# Patient Record
Sex: MALE | URBAN - METROPOLITAN AREA
[De-identification: names, ages, dates, MRNs, and addresses within clinical notes are randomized per-mention and may not be internally consistent; named-entity substitution may affect disease eponyms.]

---

## 2024-03-29 ENCOUNTER — HOSPITAL ENCOUNTER (OUTPATIENT)
Dept: TELEMEDICINE | Facility: HOSPITAL | Age: 69
Discharge: HOME | End: 2024-03-29

## 2024-03-29 DIAGNOSIS — R20.0 RIGHT SIDED NUMBNESS: Primary | ICD-10-CM

## 2024-03-29 PROCEDURE — G0408 INPT/TELE FOLLOW UP 35: HCPCS | Mod: GT,,, | Performed by: STUDENT IN AN ORGANIZED HEALTH CARE EDUCATION/TRAINING PROGRAM

## 2024-03-29 NOTE — TELEMEDICINE CONSULT
[facility]  Vascular Neurology  Comprehensive Stroke Center  TeleVascular Neurology Acute Consultation Note        Consult Information  Consults    Consulting Provider: ARLENE YODER   Current Providers  No providers found    Patient Location: [facility] ED - [place] - [facility] Emergency Department    Spoke hospital nurse at bedside with patient assisting consultant.  Patient information was obtained from patient.       Stroke Documentation  Acute Stroke Times   Last Known Normal Date: 03/29/24  Last Known Normal Time: 1330  Symptom Onset Date: 03/29/24  Stroke Team Arrival Date: 03/29/24  Stroke Team Arrival Time: 1442  CT Interpretation Time: 1450  Thrombolytic Therapy Recommended: No    NIH Scale:  1a. Level of Consciousness: 0-->Alert, keenly responsive  1b. LOC Questions: 0-->Answers both questions correctly  1c. LOC Commands: 0-->Performs both tasks correctly  2. Best Gaze: 0-->Normal  3. Visual: 0-->No visual loss  4. Facial Palsy: 0-->Normal symmetrical movements  5a. Motor Arm, Left: 0-->No drift, limb holds 90 (or 45) degrees for full 10 secs  5b. Motor Arm, Right: 0-->No drift, limb holds 90 (or 45) degrees for full 10 secs  6a. Motor Leg, Left: 0-->No drift, leg holds 30 degree position for full 5 secs  6b. Motor Leg, Right: 0-->No drift, leg holds 30 degree position for full 5 secs  7. Limb Ataxia: 0-->Absent  8. Sensory: 1-->Mild-to-moderate sensory loss, patient feels pinprick is less sharp or is dull on the affected side, or there is a loss of superficial pain with pinprick, but patient is aware of being touched  9. Best Language: 0-->No aphasia, normal  10. Dysarthria: 0-->Normal  11. Extinction and Inattention (formerly Neglect): 0-->No abnormality  Total (NIH Stroke Scale): 1      Modified [place]: Score: 0  [place] Coma Scale:     ABCD2 Score:    [id number] Score:    HAS -BLED Score:    ICH Score:    Hunt & Mulligan  Classification:      There were no vitals taken for this visit.    Van Negative    Medical Decision Making  HPI:  68 y.o. male with a history of aortic valve stenosis, obesity who presents with numbness in the right arm and heaviness in the right leg.  Symptoms noted when he awoke from a nap today.  No prior history of stroke.        Images personally reviewed and interpreted:  Study: Head CT  Study Interpretation:  No evidence of acute ischemia or hemorrhage     Assessment and plan:  67 y/o male with the above history who presents with numbness in the right arm and heaviness in the right leg.    On exam, his only deficit is right facial numbness.  No drift in the upper lower extremities.    Strong suspicion for a lacunar syndrome.  Defer IV thrombolytics due to non disabling symptoms.    -- Plavix load of 300mg x1 and ASA load of 325mg x1 now followed by dual antiplatelet therapy with ASA 81mg + Plavix 75mg x21 day, followed by monotherapy with ASA 81mg thereafter.  If already taking ASA, add Plavix with load similar to above, or, if already on Plavix, add ASA load similar to above and then dual antiplatelet therapy x21 days followed by monotherapy.  If unable to swallow, please administer rectal aspirin 300 mg until cleared by Speech therapy.    -- CTA head and neck STAT to evaluate for the presence of an intracranial LVO or high grade/critical stenosis of the extracranial carotids or vertebrobasilar insufficiency for further risk stratification and management. If noted, please call [facility] to discuss if patient needs transfer for higher level care.    -- MRI brain. Permissive HTN w/ BP<220/110 until MRI rules out the presence of acute ischemia. If acute ischemia seen, please maintain permissive HTN for the next 48-72 hrs.      -- Lipitor 40mg daily.   -- Check Lipid panel, A1c  -- Target LDL<70, A1c<7   -- TTE w/o bubble (If <60 yrs, please add bubble study)  -- PT/OT/SLP    Discussed recommendations w/ attending  physician.   If MD unavailable, recommendations will be conveyed to patient's nurse.     .      Lytics recommendation: Thrombolytic therapy not recommended due to Mild Non-Disabling Symptoms  Thrombectomy recommendation: Awaiting CTA results from Spoke for determination  and No; at this time symptoms not suggestive of large vessel occlusion  Placement recommendation: pending further studies  admit to inpatient               ROS  Physical Exam  No past medical history on file.  No past surgical history on file.  No family history on file.    Diagnoses  Problem Noted   Right Sided Numbness 3/29/2024       Flash Petit MD      Emergent/Acute neurological consultation requested by spoke provider due to critical concerns for possible cerebrovascular event that could result in permanent loss of neurologic/bodily function, severe disability or death of this patient.  Immediate/timely evaluation by a highly prepared expert is paramount for optimal outcomes  High risk for neurological deterioration if not properly diagnosed  High risk for neurological deterioration if not treated promplty/as soon as possible  Complex diagnostic evaluation may be required (advanced imaging)  High risk treatment options (thrombolytics and/or thrombectomy)    Patient care was coordinated with spoke provider, including but not limted to    Discussing likely diagnosis/etiology of symptoms  Making recommendations for further diagnostic studies  Making recommendations for intravenous thrombolytics or other advanced therapies  Making recommendations for disposition (admission/transfer for higher level of care)

## 2024-03-29 NOTE — SUBJECTIVE & OBJECTIVE
HPI:  68 y.o. male with a history of aortic valve stenosis, obesity who presents with numbness in the right arm and heaviness in the right leg.  Symptoms noted when he awoke from a nap today.  No prior history of stroke.        Images personally reviewed and interpreted:  Study: Head CT  Study Interpretation:  No evidence of acute ischemia or hemorrhage     Assessment and plan:  67 y/o male with the above history who presents with numbness in the right arm and heaviness in the right leg.    On exam, his only deficit is right facial numbness.  No drift in the upper lower extremities.    Defer IV thrombolytics due to non disabling symptoms.    -- Plavix load of 300mg x1 and ASA load of 325mg x1 now followed by dual antiplatelet therapy with ASA 81mg + Plavix 75mg x21 day, followed by monotherapy with ASA 81mg thereafter.  If already taking ASA, add Plavix with load similar to above, or, if already on Plavix, add ASA load similar to above and then dual antiplatelet therapy x21 days followed by monotherapy.  If unable to swallow, please administer rectal aspirin 300 mg until cleared by Speech therapy.    -- CTA head and neck STAT to evaluate for the presence of an intracranial LVO or high grade/critical stenosis of the extracranial carotids or vertebrobasilar insufficiency for further risk stratification and management. If noted, please call [facility] to discuss if patient needs transfer for higher level care.    -- MRI brain. Permissive HTN w/ BP<220/110 until MRI rules out the presence of acute ischemia. If acute ischemia seen, please maintain permissive HTN for the next 48-72 hrs.      -- Lipitor 40mg daily.   -- Check Lipid panel, A1c  -- Target LDL<70, A1c<7   -- TTE w/o bubble (If <60 yrs, please add bubble study)  -- PT/OT/SLP    Discussed recommendations w/ attending physician.   If MD unavailable, recommendations will be conveyed to patient's nurse.     .      Lytics recommendation: Thrombolytic therapy not  recommended due to Mild Non-Disabling Symptoms  Thrombectomy recommendation: Awaiting CTA results from Spoke for determination  and No; at this time symptoms not suggestive of large vessel occlusion  Placement recommendation: pending further studies  admit to inpatient